# Patient Record
Sex: MALE | Race: WHITE | NOT HISPANIC OR LATINO | Employment: FULL TIME | URBAN - METROPOLITAN AREA
[De-identification: names, ages, dates, MRNs, and addresses within clinical notes are randomized per-mention and may not be internally consistent; named-entity substitution may affect disease eponyms.]

---

## 2024-05-12 ENCOUNTER — OFFICE VISIT (OUTPATIENT)
Age: 24
End: 2024-05-12
Payer: COMMERCIAL

## 2024-05-12 VITALS
HEIGHT: 70 IN | SYSTOLIC BLOOD PRESSURE: 152 MMHG | DIASTOLIC BLOOD PRESSURE: 92 MMHG | WEIGHT: 223.55 LBS | RESPIRATION RATE: 16 BRPM | HEART RATE: 81 BPM | TEMPERATURE: 97 F | BODY MASS INDEX: 32 KG/M2 | OXYGEN SATURATION: 100 %

## 2024-05-12 DIAGNOSIS — S61.210A LACERATION OF RIGHT INDEX FINGER WITHOUT FOREIGN BODY WITHOUT DAMAGE TO NAIL, INITIAL ENCOUNTER: Primary | ICD-10-CM

## 2024-05-12 PROCEDURE — 90471 IMMUNIZATION ADMIN: CPT | Performed by: PHYSICIAN ASSISTANT

## 2024-05-12 PROCEDURE — 90715 TDAP VACCINE 7 YRS/> IM: CPT

## 2024-05-12 PROCEDURE — 99202 OFFICE O/P NEW SF 15 MIN: CPT | Performed by: PHYSICIAN ASSISTANT

## 2024-05-12 NOTE — PATIENT INSTRUCTIONS
Clean area daily with plain soap and water then pat dry and you may apply small amount of topical antibiotic ointment over area.    Keep clean and covered for the next couple days.      Finger splint to protect as needed.    Watch for signs of infection that could include increased redness, swelling, nasty looking discharge.  Seek further evaluation if any concerns.  If mild redness, start with warm Epsom salt soaks 5 minutes 2-3 times a day.  If worsening seek further evaluation.    Note given for work.    Follow-up as needed.

## 2024-05-12 NOTE — LETTER
May 12, 2024     Patient: Michael Navas   YOB: 2000   Date of Visit: 5/12/2024       To Whom It May Concern:    Above patient seen in office today for acute medical ailment.  May  attempt return to work in next 2-3 days as tolerated.          Sincerely,        Delmy Gardiner PA-C    CC: No Recipients

## 2024-05-12 NOTE — PROGRESS NOTES
[facility]    NAME: Michael Navas is a 23 y.o. male  : 2000    MRN: [id number]  DATE: May 12, 2024  TIME: 10:31 AM    Assessment and Plan   Laceration of right index finger without foreign body without damage to nail, initial encounter [S61.210A]  1. Laceration of right index finger without foreign body without damage to nail, initial encounter  TDAP VACCINE GREATER THAN OR EQUAL TO 8YO IM    Splint        Wound not sutured or glued in office due to length of time since injury.  Wound was cleansed and covered with clean antibiotic dressing bu nursing staff.  Nursing staff measured patient's finger for finger splint and finger splint was given to patient.  He may use this to protect finger as needed.    Work note given.    Nurse administered Tdap vaccine.      Patient Instructions     Patient Instructions   Clean area daily with plain soap and water then pat dry and you may apply small amount of topical antibiotic ointment over area.    Keep clean and covered for the next couple days.      Finger splint to protect as needed.    Watch for signs of infection that could include increased redness, swelling, nasty looking discharge.  Seek further evaluation if any concerns.  If mild redness, start with warm Epsom salt soaks 5 minutes 2-3 times a day.  If worsening seek further evaluation.    Note given for work.    Follow-up as needed.        Chief Complaint     Chief Complaint   Patient presents with    Finger Injury     Right pointer finger knuckle was skun on the top of a fence stake yesterday after striking the stake with a hammer. He cleaned the wound and dressed wound right away, placed antibiotic ointment and butterflied it. Uncertain of last DTAP vaccine.        History of Present Illness   Michael Navas presents to the clinic c/o  23-year-old right-hand-dominant male comes in with wound on back of right index finger PIP joint that he sustained yesterday when he was hammering metal fence  "post and hammer slipped off and a fence post and knuckle hit a metal fence post causing injury.    He reports that he washed it well and then applied some glue and topical antibiotic.  He says it is a little swollen and tender but he is able to move the finger pretty well.  No distal numbness or tingling.    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    Last tetanus vaccine unknown.        Review of Systems   Review of Systems   Constitutional: Negative.    Skin:  Positive for color change and wound.   Neurological:  Negative for numbness.       Current Medications     No long-term medications on file.       Current Allergies     Allergies as of 05/12/2024    (No Known Allergies)          The following portions of the patient's history were reviewed and updated as appropriate: allergies, current medications, past family history, past medical history, past social history, past surgical history and problem list.  History reviewed. No pertinent past medical history.  Past Surgical History:   Procedure Laterality Date    MOUTH SURGERY       Family History   Problem Relation Age of Onset    No Known Problems Mother     Hypertension Father        Objective   /92   Pulse 81   Temp (!) 97 °F (36.1 °C)   Resp 16   Ht 5' 10\" (1.778 m)   Wt 101 kg (223 lb 8.7 oz)   SpO2 100%   BMI 32.08 kg/m²   No LMP for male patient.       Physical Exam     Physical Exam  Vitals and nursing note reviewed.   Constitutional:       General: He is not in acute distress.     Appearance: He is well-developed. He is not ill-appearing, toxic-appearing or diaphoretic.   Cardiovascular:      Rate and Rhythm: Normal rate.   Pulmonary:      Effort: Pulmonary effort is normal.   Musculoskeletal:         General: Swelling, tenderness and signs of injury present. No deformity.      Comments: Right index finger with mild swelling over PIP joint and mild limited range of " motion.  No evidence of tendinopathy.  Neurovascular intact distally.   Skin:     General: Skin is warm and dry.      Findings: Erythema and lesion present.      Comments: Avulsed lacerated skin dorsal aspect right index finger PIP joint.  Slight separation of wound edges when patient flexes finger joint.  Minimal moist blood.  Dried glue noted.  See photos.   Neurological:      General: No focal deficit present.      Mental Status: He is alert and oriented to person, place, and time.

## 2024-06-11 PROBLEM — S61.210A LACERATION OF RIGHT INDEX FINGER WITHOUT FOREIGN BODY WITHOUT DAMAGE TO NAIL, INITIAL ENCOUNTER: Status: RESOLVED | Noted: 2024-05-12 | Resolved: 2024-06-11

## 2024-11-20 ENCOUNTER — OFFICE VISIT (OUTPATIENT)
Dept: URGENT CARE | Facility: CLINIC | Age: 24
End: 2024-11-20
Payer: COMMERCIAL

## 2024-11-20 VITALS
RESPIRATION RATE: 16 BRPM | HEART RATE: 74 BPM | OXYGEN SATURATION: 98 % | BODY MASS INDEX: 32.27 KG/M2 | HEIGHT: 70 IN | SYSTOLIC BLOOD PRESSURE: 153 MMHG | DIASTOLIC BLOOD PRESSURE: 97 MMHG | WEIGHT: 225.4 LBS | TEMPERATURE: 96.9 F

## 2024-11-20 DIAGNOSIS — H66.92 LEFT OTITIS MEDIA, UNSPECIFIED OTITIS MEDIA TYPE: Primary | ICD-10-CM

## 2024-11-20 PROCEDURE — G0382 LEV 3 HOSP TYPE B ED VISIT: HCPCS

## 2024-11-20 PROCEDURE — S9083 URGENT CARE CENTER GLOBAL: HCPCS

## 2024-11-20 NOTE — PROGRESS NOTES
[facility]        NAME: Michael Navas is a 24 y.o. male  : 2000    MRN: [id number]  DATE: 2024  TIME: 8:33 AM    Assessment and Plan   Left otitis media, unspecified otitis media type [H66.92]  1. Left otitis media, unspecified otitis media type  amoxicillin-clavulanate (AUGMENTIN) 875-125 mg per tablet        Clinical findings correlate with left-sided OM and will treat with Augmentin. Encouraged continued supportive measures.  Follow up with PCP in 3-5 days or proceed to emergency department for worsening symptoms.  Patient verbalized understanding of instructions given.       Patient Instructions     Patient Instructions   Take antibiotic as prescribed  Continue with supportive measures, OTC Tylenol/Ibuprofen, nasal decongestants, and cough suppressants   Cool mist humidifiers, throat lozenges, increased fluid intake and rest   Follow up with PCP in 3-5 days  Present to ER if symptoms worsen     If tests have been performed at [facility], our office will contact you with results if changes need to be made to the care plan discussed with you at the visit.  You can review your full results on [facility] MyChart.      Patient Education     Ear infections in adults   The Basics   Written by the doctors and editors at [facility]   What is an ear infection? -- An ear infection is a condition that can cause pain in the ear, fever, and trouble hearing. Ear infections are more common in children than in adults.  Ear infections often occur after a cold or problem with seasonal allergies. Ear infections in adults happen more often in people who have a problem with their Eustachian tubes. The Eustachian tube connects the middle ear (the part of the ear behind the eardrum) to the back of the nose and throat.  Fluid can build up in the middle part of the ear behind the eardrum. This fluid can become infected and press on the eardrum, causing it to bulge (figure 1). This causes symptoms.  The  "medical term for middle ear infections is \"otitis media.\"  What are the symptoms of an ear infection? -- In adults, the symptoms include:   Ear pain   Temporary hearing loss   Feeling dizzy  How do I know if I have an ear infection? -- If you think that you have an ear infection, see a doctor or nurse. They will ask you about your symptoms, do an exam, and look in your ears.  How is an ear infection treated? -- Doctors treat ear infections in adults with antibiotics. These medicines kill the bacteria that cause some ear infections. Even though some ear infections are caused by a virus, doctors often prescribe antibiotics for adults anyway. That's because ear infections can lead to other problems if they are not treated quickly.  Is there anything I can do on my own to feel better?    Take all of your medicines as instructed. If the doctor prescribes antibiotics, finish all of them, even if you start to feel better.   You can take non-prescription medicines to relieve pain. Examples include acetaminophen (sample brand name: Tylenol), ibuprofen (sample brand names: Advil, Motrin), or naproxen (sample brand name: Aleve).   You can try ice or heat to help with ear pain. Do not sleep with ice or heat on your ear.   Put a cold gel pack, bag of ice, or bag of frozen vegetables on the ear every 1 to 2 hours, for 15 minutes each time. Put a thin towel between the ice (or other cold object) and the skin.   Put a heating pad, warm towel, or hot water bottle on the ear every 1 to 2 hours, for 15 minutes at a time. Put a thin towel between the warm object and the skin.   Do not put anything in your ear unless the doctor or nurse told you to.   Airplane travel can make ear pain worse, especially as the plane starts to land. Chewing gum, drinking, or eating food might help.  Can ear infections be prevented? -- To lower your risk of getting an ear infection:   If you smoke, try to stop. Avoid places where others are smoking.   Wash " "your hands often.   Stay away from others who are sick with a cold or viral infection.   Get all of the vaccines your doctor recommends.  When should I call the doctor? -- Call for advice if:   Your symptoms are not getting better in 2 to 3 days.   You continue to have hearing problems after 2 to 3 weeks.   You have a fever of 100.4°F (38°C) or higher, or chills.   You have discharge or drainage coming from your ear.  All topics are updated as new evidence becomes available and our peer review process is complete.  This topic retrieved from [employer] on: May 15, 2024.  Topic 757210 Version 1.0  Release: 32.4.3 - C32.134  © 2024 UpToDate, Inc. and/or its affiliates. All rights reserved.  figure 1: Ear infection (otitis media)     The ear on the left is normal and does not have an infection. The ear on the right shows what an infection can look like. The infected fluid in the middle ear causes the eardrum to bulge. Normally, fluid in the middle ear drains into the throat through a tube called the \"Eustachian tube.\" But during an infection, swelling blocks off the tube, so fluid builds up.  Graphic 69519 Version 8.0  Consumer Information Use and Disclaimer   Disclaimer: This generalized information is a limited summary of diagnosis, treatment, and/or medication information. It is not meant to be comprehensive and should be used as a tool to help the user understand and/or assess potential diagnostic and treatment options. It does NOT include all information about conditions, treatments, medications, side effects, or risks that may apply to a specific patient. It is not intended to be medical advice or a substitute for the medical advice, diagnosis, or treatment of a health care provider based on the health care provider's examination and assessment of a patient's specific and unique circumstances. Patients must speak with a health care provider for complete information about their health, medical questions, and " treatment options, including any risks or benefits regarding use of medications. This information does not endorse any treatments or medications as safe, effective, or approved for treating a specific patient. UpToDate, Inc. and its affiliates disclaim any warranty or liability relating to this information or the use thereof.The use of this information is governed by the Terms of Use, available at https://www.[employer].com/en/know/clinical-effectiveness-terms. 2024© UpToDate, Inc. and its affiliates and/or licensors. All rights reserved.  Copyright   © 2024 UpToDate, Inc. and/or its affiliates. All rights reserved.        Chief Complaint     Chief Complaint   Patient presents with    Earache     Left ear pain starting yesterday, worsening over night. This am hearing muffled in ear.          History of Present Illness       24-year-old male with no significant past medical history presents with complaints of left-sided ear pain and hearing loss x 2 days.  No fever or URI symptoms.  No ear discharge.    Earache   Associated symptoms include hearing loss. Pertinent negatives include no abdominal pain, coughing, diarrhea, ear discharge, rash, rhinorrhea, sore throat or vomiting.       Review of Systems   Review of Systems   Constitutional:  Negative for chills and fever.   HENT:  Positive for ear pain and hearing loss. Negative for congestion, ear discharge, rhinorrhea and sore throat.    Eyes:  Negative for discharge.   Respiratory:  Negative for cough, shortness of breath and wheezing.    Cardiovascular:  Negative for chest pain.   Gastrointestinal:  Negative for abdominal pain, diarrhea, nausea and vomiting.   Skin:  Negative for rash.         Current Medications       Current Outpatient Medications:     amoxicillin-clavulanate (AUGMENTIN) 875-125 mg per tablet, Take 1 tablet by mouth every 12 (twelve) hours for 7 days, Disp: 14 tablet, Rfl: 0    Current Allergies     Allergies as of 11/20/2024    (No Known  "Allergies)            The following portions of the patient's history were reviewed and updated as appropriate: allergies, current medications, past family history, past medical history, past social history, past surgical history and problem list.     History reviewed. No pertinent past medical history.    Past Surgical History:   Procedure Laterality Date    MOUTH SURGERY         Family History   Problem Relation Age of Onset    No Known Problems Mother     Hypertension Father          Medications have been verified.        Objective   /97   Pulse 74   Temp (!) 96.9 °F (36.1 °C)   Resp 16   Ht 5' 10\" (1.778 m)   Wt 102 kg (225 lb 6.4 oz)   SpO2 98%   BMI 32.34 kg/m²   No LMP for male patient.       Physical Exam     Physical Exam  Vitals and nursing note reviewed.   Constitutional:       General: He is not in acute distress.     Appearance: He is not toxic-appearing.   HENT:      Head: Normocephalic.      Right Ear: Tympanic membrane, ear canal and external ear normal.      Left Ear: Ear canal and external ear normal. Tympanic membrane is erythematous. Tympanic membrane is not bulging.      Nose: Nose normal.      Mouth/Throat:      Mouth: Mucous membranes are moist.      Pharynx: Oropharynx is clear.   Eyes:      Conjunctiva/sclera: Conjunctivae normal.   Cardiovascular:      Rate and Rhythm: Normal rate and regular rhythm.      Heart sounds: Normal heart sounds.   Pulmonary:      Effort: Pulmonary effort is normal. No respiratory distress.      Breath sounds: Normal breath sounds. No stridor. No wheezing, rhonchi or rales.   Lymphadenopathy:      Cervical: No cervical adenopathy.   Skin:     General: Skin is warm and dry.   Neurological:      Mental Status: He is alert and oriented to person, place, and time.      Gait: Gait is intact.   Psychiatric:         Mood and Affect: Mood normal.         Behavior: Behavior normal.                   "

## 2024-11-20 NOTE — PATIENT INSTRUCTIONS
"Take antibiotic as prescribed  Continue with supportive measures, OTC Tylenol/Ibuprofen, nasal decongestants, and cough suppressants   Cool mist humidifiers, throat lozenges, increased fluid intake and rest   Follow up with PCP in 3-5 days  Present to ER if symptoms worsen     If tests have been performed at [facility], our office will contact you with results if changes need to be made to the care plan discussed with you at the visit.  You can review your full results on [facility] MyChart.      Patient Education     Ear infections in adults   The Basics   Written by the doctors and editors at [facility]   What is an ear infection? -- An ear infection is a condition that can cause pain in the ear, fever, and trouble hearing. Ear infections are more common in children than in adults.  Ear infections often occur after a cold or problem with seasonal allergies. Ear infections in adults happen more often in people who have a problem with their Eustachian tubes. The Eustachian tube connects the middle ear (the part of the ear behind the eardrum) to the back of the nose and throat.  Fluid can build up in the middle part of the ear behind the eardrum. This fluid can become infected and press on the eardrum, causing it to bulge (figure 1). This causes symptoms.  The medical term for middle ear infections is \"otitis media.\"  What are the symptoms of an ear infection? -- In adults, the symptoms include:   Ear pain   Temporary hearing loss   Feeling dizzy  How do I know if I have an ear infection? -- If you think that you have an ear infection, see a doctor or nurse. They will ask you about your symptoms, do an exam, and look in your ears.  How is an ear infection treated? -- Doctors treat ear infections in adults with antibiotics. These medicines kill the bacteria that cause some ear infections. Even though some ear infections are caused by a virus, doctors often prescribe antibiotics for adults anyway. That's because ear " infections can lead to other problems if they are not treated quickly.  Is there anything I can do on my own to feel better?    Take all of your medicines as instructed. If the doctor prescribes antibiotics, finish all of them, even if you start to feel better.   You can take non-prescription medicines to relieve pain. Examples include acetaminophen (sample brand name: Tylenol), ibuprofen (sample brand names: Advil, Motrin), or naproxen (sample brand name: Aleve).   You can try ice or heat to help with ear pain. Do not sleep with ice or heat on your ear.   Put a cold gel pack, bag of ice, or bag of frozen vegetables on the ear every 1 to 2 hours, for 15 minutes each time. Put a thin towel between the ice (or other cold object) and the skin.   Put a heating pad, warm towel, or hot water bottle on the ear every 1 to 2 hours, for 15 minutes at a time. Put a thin towel between the warm object and the skin.   Do not put anything in your ear unless the doctor or nurse told you to.   Airplane travel can make ear pain worse, especially as the plane starts to land. Chewing gum, drinking, or eating food might help.  Can ear infections be prevented? -- To lower your risk of getting an ear infection:   If you smoke, try to stop. Avoid places where others are smoking.   Wash your hands often.   Stay away from others who are sick with a cold or viral infection.   Get all of the vaccines your doctor recommends.  When should I call the doctor? -- Call for advice if:   Your symptoms are not getting better in 2 to 3 days.   You continue to have hearing problems after 2 to 3 weeks.   You have a fever of 100.4°F (38°C) or higher, or chills.   You have discharge or drainage coming from your ear.  All topics are updated as new evidence becomes available and our peer review process is complete.  This topic retrieved from [employer] on: May 15, 2024.  Topic 846302 Version 1.0  Release: 32.4.3 - C32.134  © 2024 UpToDate, Inc. and/or its  "affiliates. All rights reserved.  figure 1: Ear infection (otitis media)     The ear on the left is normal and does not have an infection. The ear on the right shows what an infection can look like. The infected fluid in the middle ear causes the eardrum to bulge. Normally, fluid in the middle ear drains into the throat through a tube called the \"Eustachian tube.\" But during an infection, swelling blocks off the tube, so fluid builds up.  Graphic 28738 Version 8.0  Consumer Information Use and Disclaimer   Disclaimer: This generalized information is a limited summary of diagnosis, treatment, and/or medication information. It is not meant to be comprehensive and should be used as a tool to help the user understand and/or assess potential diagnostic and treatment options. It does NOT include all information about conditions, treatments, medications, side effects, or risks that may apply to a specific patient. It is not intended to be medical advice or a substitute for the medical advice, diagnosis, or treatment of a health care provider based on the health care provider's examination and assessment of a patient's specific and unique circumstances. Patients must speak with a health care provider for complete information about their health, medical questions, and treatment options, including any risks or benefits regarding use of medications. This information does not endorse any treatments or medications as safe, effective, or approved for treating a specific patient. UpToDate, Inc. and its affiliates disclaim any warranty or liability relating to this information or the use thereof.The use of this information is governed by the Terms of Use, available at https://www.[employer].com/en/know/clinical-effectiveness-terms. 2024© UpToDate, Inc. and its affiliates and/or licensors. All rights reserved.  Copyright   © 2024 UpToDate, Inc. and/or its affiliates. All rights reserved.    "

## 2024-11-20 NOTE — LETTER
November 20, 2024     Patient: Michael Navas   YOB: 2000   Date of Visit: 11/20/2024       To Whom it May Concern:    Michael Navas was seen in my clinic on 11/20/2024.     If you have any questions or concerns, please don't hesitate to call.         Sincerely,          MICHEL Cleveland        CC: No Recipients